# Patient Record
Sex: FEMALE | Race: WHITE | URBAN - METROPOLITAN AREA
[De-identification: names, ages, dates, MRNs, and addresses within clinical notes are randomized per-mention and may not be internally consistent; named-entity substitution may affect disease eponyms.]

---

## 2017-03-24 ENCOUNTER — HISTORICAL (OUTPATIENT)
Dept: ADMINISTRATIVE | Facility: HOSPITAL | Age: 82
End: 2017-03-24

## 2020-03-15 ENCOUNTER — HOSPITAL ENCOUNTER (OUTPATIENT)
Dept: MEDSURG UNIT | Facility: HOSPITAL | Age: 85
End: 2020-03-17
Attending: INTERNAL MEDICINE | Admitting: INTERNAL MEDICINE

## 2020-03-15 LAB
ABS NEUT (OLG): 2.01 X10(3)/MCL (ref 2.1–9.2)
ALBUMIN SERPL-MCNC: 3.2 GM/DL (ref 3.4–5)
ALBUMIN/GLOB SERPL: 1.2 RATIO (ref 1.1–2)
ALP SERPL-CCNC: 141 UNIT/L (ref 38–126)
ALT SERPL-CCNC: 15 UNIT/L (ref 12–78)
APPEARANCE, UA: CLEAR
AST SERPL-CCNC: 18 UNIT/L (ref 15–37)
BACTERIA SPEC CULT: NORMAL /HPF
BASOPHILS # BLD AUTO: 0 X10(3)/MCL (ref 0–0.2)
BASOPHILS NFR BLD AUTO: 1 %
BILIRUB SERPL-MCNC: 0.8 MG/DL (ref 0.2–1)
BILIRUB UR QL STRIP: NEGATIVE
BILIRUBIN DIRECT+TOT PNL SERPL-MCNC: 0.2 MG/DL (ref 0–0.5)
BILIRUBIN DIRECT+TOT PNL SERPL-MCNC: 0.6 MG/DL (ref 0–0.8)
BNP BLD-MCNC: 176 PG/ML (ref 0–125)
BNP BLD-MCNC: 295 PG/ML (ref 0–100)
BUN SERPL-MCNC: 19 MG/DL (ref 7–18)
CALCIUM SERPL-MCNC: 8.5 MG/DL (ref 8.5–10.1)
CHLORIDE SERPL-SCNC: 109 MMOL/L (ref 98–107)
CO2 SERPL-SCNC: 29 MMOL/L (ref 21–32)
COLOR UR: YELLOW
CREAT SERPL-MCNC: 1.04 MG/DL (ref 0.55–1.02)
EOSINOPHIL # BLD AUTO: 0.1 X10(3)/MCL (ref 0–0.9)
EOSINOPHIL NFR BLD AUTO: 3 %
ERYTHROCYTE [DISTWIDTH] IN BLOOD BY AUTOMATED COUNT: 14 % (ref 11.5–17)
GLOBULIN SER-MCNC: 2.7 GM/DL (ref 2.4–3.5)
GLUCOSE (UA): NEGATIVE
GLUCOSE SERPL-MCNC: 121 MG/DL (ref 74–106)
HCT VFR BLD AUTO: 44.9 % (ref 37–47)
HGB BLD-MCNC: 14.4 GM/DL (ref 12–16)
HGB UR QL STRIP: NEGATIVE
KETONES UR QL STRIP: NEGATIVE
LEUKOCYTE ESTERASE UR QL STRIP: NEGATIVE
LYMPHOCYTES # BLD AUTO: 1.6 X10(3)/MCL (ref 0.6–4.6)
LYMPHOCYTES NFR BLD AUTO: 38 %
MAGNESIUM SERPL-MCNC: 2.1 MG/DL (ref 1.8–2.4)
MCH RBC QN AUTO: 31.1 PG (ref 27–31)
MCHC RBC AUTO-ENTMCNC: 32.1 GM/DL (ref 33–36)
MCV RBC AUTO: 97 FL (ref 80–94)
MONOCYTES # BLD AUTO: 0.4 X10(3)/MCL (ref 0.1–1.3)
MONOCYTES NFR BLD AUTO: 10 %
NEUTROPHILS # BLD AUTO: 2.01 X10(3)/MCL (ref 2.1–9.2)
NEUTROPHILS NFR BLD AUTO: 48 %
NITRITE UR QL STRIP: NEGATIVE
PH UR STRIP: 6.5 [PH] (ref 5–9)
PHOSPHATE SERPL-MCNC: 2.9 MG/DL (ref 2.5–4.9)
PLATELET # BLD AUTO: 193 X10(3)/MCL (ref 130–400)
PMV BLD AUTO: 10 FL (ref 9.4–12.4)
POC TROPONIN: 0.01 NG/ML (ref 0–0.08)
POTASSIUM SERPL-SCNC: 3.7 MMOL/L (ref 3.5–5.1)
PROT SERPL-MCNC: 5.9 GM/DL (ref 6.4–8.2)
PROT UR QL STRIP: NEGATIVE
RBC # BLD AUTO: 4.63 X10(6)/MCL (ref 4.2–5.4)
RBC #/AREA URNS HPF: NORMAL /[HPF]
SODIUM SERPL-SCNC: 144 MMOL/L (ref 136–145)
SP GR UR STRIP: 1.02 (ref 1–1.03)
SQUAMOUS EPITHELIAL, UA: NORMAL
UROBILINOGEN UR STRIP-ACNC: 1
WBC # SPEC AUTO: 4.2 X10(3)/MCL (ref 4.5–11.5)
WBC #/AREA URNS HPF: NORMAL /[HPF]

## 2022-04-10 ENCOUNTER — HISTORICAL (OUTPATIENT)
Dept: ADMINISTRATIVE | Facility: HOSPITAL | Age: 87
End: 2022-04-10

## 2022-04-29 VITALS
OXYGEN SATURATION: 94 % | DIASTOLIC BLOOD PRESSURE: 58 MMHG | HEIGHT: 62 IN | WEIGHT: 141.13 LBS | BODY MASS INDEX: 25.97 KG/M2 | SYSTOLIC BLOOD PRESSURE: 118 MMHG

## 2022-04-30 NOTE — H&P
Patient:   Sarai Zapata             MRN: [id number]            FIN: [id number]               Age:   89 years     Sex:  Female     :  1918   Associated Diagnoses:   None   Author:   Pierre Lou II, MD      Chief Complaint   3/15/2020 6:01 CDT       reports legs progressively getting weaker. unable to ambulate with walker this am        History of Present Illness   Ms. Moon is 89-year-old female patient of Dr. Jones is presented to the emergency room today with generalized decline deconditioning.  She does live with her 80-year-old daughter who can no longer take care of her.  Labs are essentially normal chest x-ray is clear urine is clear main complaint is that her legs are progressively getting weaker unable to ambulate with a walker this morning.  Normally is able to ambulate.  Rest of the history was difficult to obtain because the daughter was not in the room on exam.  The patient is awake alert and oriented appears okay vital signs are stable.  She is being admitted because of generalized decline degenerative generalized weakening.  May benefit from placement at some point.  She was recently seen by Dr. Jones in the office had a work-up for her leg edema knee but was negative by way of xray      Review of Systems   Constitutional:  Weakness, Fatigue, Decreased activity, No fever, No chills.    Eye:  Negative.    Ear/Nose/Mouth/Throat:  Negative.    Respiratory:  No shortness of breath, No cough, No hemoptysis, No wheezing.    Cardiovascular:  No chest pain, No palpitations, No peripheral edema.    Gastrointestinal:  No nausea, No vomiting, No diarrhea, No constipation, No heartburn, No abdominal pain, No hematemesis.    Genitourinary:  No dysuria, No hematuria, No change in urine stream, No urethral discharge.    Hematology/Lymphatics:  Negative.    Endocrine:  No excessive thirst, No polyuria, No cold intolerance, No heat intolerance, No excessive hunger.    Immunologic:  Negative.   "  Musculoskeletal:  Joint pain, No neck pain, No muscle pain, No claudication.    Integumentary:  No rash.    Neurologic:  Alert and oriented X4, No confusion.    Psychiatric:  No anxiety, No depression.    All other systems are negative      Health Status   Allergies:    Allergic Reactions (Selected)  No Known Medication Allergies,    Allergies (1) Active Reaction  No Known Medication Allergies None Documented     Current medications:  (Selected)   Prescriptions  Prescribed  18" Standard Wheelchair with elevating leg rests: 18" Standard Wheelchair with elevating leg rests, See Instructions, Dx: decreased mobility, frequent falls, advanced age    RADHA: 99 months, # 1 EA, 0 Refill(s)  hydrochlorothiazide 25 mg oral tablet: See Instructions, TAKE ONE TABLET BY MOUTH ONCE DAILY, # 90 tab(s), 5 Refill(s), eRx: [facility]  meclizine 12.5 mg oral tablet: See Instructions, TAKE ONE TABLET BY MOUTH THREE TIMES DAILY AS NEEDED FOR DIZZINESS, # 90 tab(s), 1 Refill(s), eRx: [id number],    Home Medications (3) Active  18" Standard Wheelchair with elevating leg rests See Instructions  hydrochlorothiazide 25 mg oral tablet See Instructions  meclizine 12.5 mg oral tablet See Instructions     Problem list:    All Problems  Benign Essential Hypertension(  Confirmed  ) / SNOMED CT 5651727 / Confirmed      Histories   Past Medical History:    Resolved  Cataract of right eye(  Confirmed  ) (245129500):  Resolved.  Diarrhea NOS(  Confirmed  ) (104035298):  Resolved.  Pre-operative clearance(  Confirmed  ) (228544749):  Resolved.  Hypertension (18645710):  Resolved.   Family History:    Unable to obtain.   Procedure history:    appendectomy.   Social History        Social & Psychosocial Habits    Alcohol  03/20/2017  Use: Current    Type: Wine    Frequency: 1-2 times per week    Has alcohol use interfered with work or home life? No    Do you ever drink more than intended? No    Has anyone been hurt " or at risk by your drinking? No    Ready to change: No    Concerns about alcohol use in household: No    Employment/School  03/24/2015  Status: Retired    Exercise  03/24/2015 Risk Assessment: Does not exercise      Comment: never - 03/24/2015 09:05 Adelaide Finch    Home/Environment  03/24/2015  Lives with: Children    Nutrition/Health  03/24/2015  Type of diet: Regular    Sexual  03/24/2015  Sexually active: No    Substance Use    Comment: never - 03/24/2015 09:05 Adelaide Finch    Tobacco  03/05/2020  Use: Never (less than 100 in l    Patient Wants Consult For Cessation Counseling N/A    03/15/2020  Use: Never (less than 100 in l    Patient Wants Consult For Cessation Counseling N/A    Abuse/Neglect  03/05/2020  SHX Any signs of abuse or neglect No    Feels unsafe at home: No    Safe place to go: Yes    03/15/2020  [facility]X Any signs of abuse or neglect No  .        Physical Examination   Vital Signs   3/15/2020 11:46 CDT      Temperature Oral          36.3 DegC                             Temperature Oral (calculated)             97.34 DegF                             Peripheral Pulse Rate     91 bpm                             SpO2                      93 %  LOW                             Systolic Blood Pressure   196 mmHg  HI                             Diastolic Blood Pressure  114 mmHg  HI                             Mean Arterial Pressure, Cuff              142 mmHg    3/15/2020 11:00 CDT      Peripheral Pulse Rate     81 bpm                             Heart Rate Monitored      90 bpm                             Respiratory Rate          17 br/min                             SpO2                      98 %                             Oxygen Therapy            Nasal cannula                             Oxygen Flow Rate          2 L/min                             Systolic Blood Pressure   191 mmHg  HI                             Diastolic Blood Pressure  107 mmHg  HI                             Mean  Arterial Pressure, Cuff              135 mmHg    3/15/2020 10:00 CDT      Peripheral Pulse Rate     78 bpm                             Heart Rate Monitored      89 bpm                             Respiratory Rate          17 br/min                             SpO2                      97 %                             Oxygen Therapy            Nasal cannula                             Oxygen Flow Rate          2 L/min                             Systolic Blood Pressure   187 mmHg  HI                             Diastolic Blood Pressure  94 mmHg  HI                             Mean Arterial Pressure, Cuff              125 mmHg    3/15/2020 9:00 CDT       Peripheral Pulse Rate     91 bpm                             Heart Rate Monitored      91 bpm                             Respiratory Rate          15 br/min                             SpO2                      97 %                             Oxygen Therapy            Nasal cannula                             Oxygen Flow Rate          2 L/min                             Systolic Blood Pressure   199 mmHg  HI                             Diastolic Blood Pressure  104 mmHg  HI                             Mean Arterial Pressure, Cuff              136 mmHg    3/15/2020 8:31 CDT       Peripheral Pulse Rate     85 bpm                             Heart Rate Monitored      89 bpm                             Respiratory Rate          21 br/min                             SpO2                      97 %                             Oxygen Therapy            Nasal cannula                             Oxygen Flow Rate          2 L/min                             Systolic Blood Pressure   184 mmHg  HI                             Diastolic Blood Pressure  92 mmHg  HI                             Mean Arterial Pressure, Cuff              123 mmHg    3/15/2020 8:00 CDT       Peripheral Pulse Rate     77 bpm                             Heart Rate Monitored      91 bpm                              Respiratory Rate          20 br/min                             SpO2                      98 %                             Oxygen Therapy            Nasal cannula                             Oxygen Flow Rate          2 L/min                             Systolic Blood Pressure   193 mmHg  HI                             Diastolic Blood Pressure  98 mmHg  HI                             Mean Arterial Pressure, Cuff              130 mmHg    3/15/2020 7:00 CDT       Peripheral Pulse Rate     85 bpm                             Heart Rate Monitored      90 bpm                             Respiratory Rate          17 br/min                             SpO2                      95 %                             Oxygen Therapy            Nasal cannula                             Oxygen Flow Rate          2 L/min                             Systolic Blood Pressure   179 mmHg  HI                             Diastolic Blood Pressure  90 mmHg                             Mean Arterial Pressure, Cuff              120 mmHg    3/15/2020 6:37 CDT       Peripheral Pulse Rate     84 bpm                             Heart Rate Monitored      90 bpm                             Respiratory Rate          21 br/min                             SpO2                      96 %                             Oxygen Therapy            Nasal cannula                             Oxygen Flow Rate          2 L/min                             Systolic Blood Pressure   174 mmHg  HI                             Diastolic Blood Pressure  84 mmHg                             Mean Arterial Pressure, Cuff              114 mmHg    3/15/2020 6:01 CDT       Temperature Oral          36.6 DegC                             Temperature Oral (calculated)             97.88 DegF                             Peripheral Pulse Rate     96 bpm                             Respiratory Rate          18 br/min                             SpO2                      94 %                              Oxygen Therapy            Nasal cannula                             Oxygen Flow Rate          2 L/min                             Systolic Blood Pressure   158 mmHg  HI                             Diastolic Blood Pressure  63 mmHg     Measurements from flowsheet : Measurements   3/15/2020 11:52 CDT      Weight Dosing             68 kg                             Weight Measured           68 kg                             Weight Measured and Calculated in Lbs     149.91 lb                             Height/Length Dosing      160 cm                             Height/Length Measured    160 cm                             Body Mass Index Measured  26.56 kg/m2    3/15/2020 6:01 CDT       Weight Dosing             73 kg                             Weight Measured and Calculated in Lbs     160.94 lb                             Weight Estimated          73 kg                             Height/Length Estimated   165 cm                             Body Mass Index Estimated 26.81 kg/m2     General:  Alert and oriented, No acute distress.    Eye:  Pupils are equal, round and reactive to light, Extraocular movements are intact.    HENT:  Normocephalic.    Neck:  Supple, Non-tender, No carotid bruit, No jugular venous distention, No lymphadenopathy, No thyromegaly.    Respiratory:  Lungs are clear to auscultation, Respirations are non-labored, Breath sounds are equal, Symmetrical chest wall expansion.    Cardiovascular:  Normal rate, Regular rhythm, No gallop, Normal peripheral perfusion, 2/6 PAUL, 1+ edema.    Gastrointestinal:  Non-tender, Non-distended, Normal bowel sounds, No organomegaly.    Genitourinary:  No costovertebral angle tenderness.    Lymphatics:  No lymphadenopathy neck, axilla, groin.    Integumentary:  Warm.    Neurologic:  Alert, Oriented, Normal sensory, Normal motor function, No focal deficits, Cranial Nerves II-XII are grossly intact, Normal deep tendon reflexes.    Psychiatric:   Cooperative, Appropriate mood & affect, Normal judgment.       Health Maintenance      Health Maintenance     Pending (in the next year)        OverDue           Tetanus Vaccine due  03/20/18  and every 10  year(s)           Zoster Vaccine due  03/20/18  and every 100  year(s)           Cognitive Screening due  01/01/20  and every 1  year(s)           Fall Risk Assessment due  01/01/20  and every 1  year(s)        Due            Pneumococcal Vaccine due  03/15/20  and every         Refused            Bone Density Screening due  03/15/20  Variable frequency           Pneumococcal Vaccine due  03/15/20  Variable frequency        Due In Future            Hypertension Management-Education not due until  08/21/20  and every 1  year(s)           Medicare Annual Wellness Exam not due until  08/21/20  and every 1  year(s)           Advance Directive not due until  01/01/21  and every 1  year(s)           Functional Assessment not due until  01/01/21  and every 1  year(s)           Geriatric Depression Screening not due until  01/01/21  and every 1  year(s)           Obesity Screening not due until  01/01/21  and every 1  year(s)           ADL Screening not due until  03/05/21  and every 1  year(s)     Satisfied (in the past 1 year)        Satisfied            ADL Screening on  03/05/20.  Satisfied by Hannah Sigala.           Advance Directive on  03/05/20.  Satisfied by Hannah Sigala.           Blood Pressure Screening on  03/15/20.  Satisfied by Tiffanie Lagunas CNA           Body Mass Index Check on  03/15/20.  Satisfied by Hortencia Paredes RN           Depression Screening on  03/05/20.  Satisfied by Hannah Sigala.           Diabetes Screening on  03/15/20.  Satisfied by Anastasia Porter           Functional Assessment on  03/15/20.  Satisfied by Hortencia Paredes RN           Geriatric Depression Screening on  03/05/20.  Satisfied by Hannah Sigala.           Hypertension Management-Blood  Pressure on  03/15/20.  Satisfied by Tiffanie Lagunas CNA           Hypertension Management-BMP on  03/15/20.  Satisfied by Anastasia Porter           Hypertension Management-Education on  08/21/19.  Satisfied by Gisela Lopez LPN.           Influenza Vaccine on  03/15/20.  Satisfied by Hortencia Paredes RN           Medicare Annual Wellness Exam on  08/21/19.  Satisfied by Jan Jones MD           Obesity Screening on  03/15/20.  Satisfied by Hortencia Paredes RN        Refused            Pneumococcal Vaccine on  08/21/19.  Recorded by Gisela Lopez LPN.  Reason: Patient Refuses          Review / Management   Results review:  Lab results   3/15/2020 9:58 CDT       POC Troponin              0.01 ng/mL    3/15/2020 7:35 CDT       UA Appear                 Clear                             UA Color                  Yellow                             UA Spec Grav              1.020                             UA Bili                   Negative                             UA pH                     6.5                             UA Urobilinogen           1.0                             UA Blood                  Negative                             UA Glucose                Negative                             UA Ketones                Negative                             UA Protein                Negative                             UA Nitrite                Negative                             UA Leuk Est               Negative                             UA WBC                    None Seen                             UA RBC                    None Seen                             UA Bacteria               None Seen /HPF                             UA Squam Epithelial       None Seen    3/15/2020 6:37 CDT       POC BNP iSTAT             295 pg/mL  HI    3/15/2020 6:33 CDT       Sodium Lvl                144 mmol/L                             Potassium Lvl             3.7 mmol/L                              Chloride                  109 mmol/L  HI                             CO2                       29.0 mmol/L                             Calcium Lvl               8.5 mg/dL                             Magnesium Lvl             2.1 mg/dL                             Glucose Lvl               121 mg/dL  HI                             BUN                       19.0 mg/dL  HI                             Creatinine                1.04 mg/dL  HI                             eGFR-AA                   >60 mL/min/1.73 m2  NA                             eGFR-MARTHA                  52 mL/min/1.73 m2  NA                             Bili Total                0.8 mg/dL                             Bili Direct               0.20 mg/dL                             Bili Indirect             0.60 mg/dL                             AST                       18 unit/L                             ALT                       15 unit/L                             Alk Phos                  141 unit/L  HI                             Total Protein             5.9 gm/dL  LOW                             Albumin Lvl               3.20 gm/dL  LOW                             Globulin                  2.70 gm/dL                             A/G Ratio                 1.2 ratio                             Phosphorus                2.9 mg/dL                             BNP                       176 pg/mL  HI                             WBC                       4.2 x10(3)/mcL  LOW                             RBC                       4.63 x10(6)/mcL                             Hgb                       14.4 gm/dL                             Hct                       44.9 %                             Platelet                  193 x10(3)/mcL                             MCV                       97.0 fL  HI                             MCH                       31.1 pg  HI                             MCHC                      32.1 gm/dL  LOW                              RDW                       14.0 %                             MPV                       10.0 fL                             Abs Neut                  2.01 x10(3)/mcL  LOW                             Neutro Auto               48 %  NA                             Lymph Auto                38 %  NA                             Mono Auto                 10 %  NA                             Eos Auto                  3 %  NA                             Abs Eos                   0.1 x10(3)/mcL                             Basophil Auto             1 %  NA                             Abs Neutro                2.01 x10(3)/mcL  LOW                             Abs Lymph                 1.6 x10(3)/mcL                             Abs Mono                  0.4 x10(3)/mcL                             Abs Baso                  0.0 x10(3)/mcL  .       Impression and Plan   1.  Advanced age with significant progressive deconditioning.   2.  Hypertension  3.  Work-up was negative about a week ago  4.  Malnutrition however albumin is 3.2    Plan:  1.  Being admitted for observation.  2.  Given her advanced deconditioning and also the 80-year-old daughter now can no longer care for appropriately may need case management involvement for placement purposes.  3.  PRN antihypertensives  4.  The daughter was not in the room when it was not able to obtain resuscitative status  5.  Dr. Jones will be back tomorrow

## 2022-04-30 NOTE — DISCHARGE SUMMARY
DISCHARGE DATE:      HOSPITAL COURSE:  A 89-year-old female patient with history of hypertension, otherwise, very healthy for her age, who has been falling at home.  The daughter, who has not been able to take care of her, decided to bring her to the emergency room.  Since the patient's insurance will not cover a nursing home placement, therefore, she will go home with home health.  Other than that, patient has been participating in physical therapy.  Excellent appetite.  Bowels are moving.  No fever, no chills.  At this moment, due to the coronavirus epidemic, it is best for her to be at home.  Case was discuss with the patient and her family.  Arrangement is made for home health.  Patient will be picked up today by her daughter to go home.  Fall did not cause any trauma or fracture.  Patient is totally asymptomatic, good sleeping habits and good spirits.    PHYSICAL EXAMINATION:  VITAL SIGNS:  Blood pressure 154/70, respiratory rate 16, pulse is 90, temperature 36.6.   HEENT:  Within normal limits.   HEART:  Auscultation, systolic murmur 3-4/6.   LUNGS:  Clear bilaterally.   ABDOMEN:  Benign.   EXTREMITIES:  No clubbing, cyanosis or edema.    FINAL DIAGNOSES:  Weakness, multifactorial, in a very elderly patient with history of hypertension controlled, osteoarthritis, frequent falls, again, multifactorial.     Due to fear of contamination, we will not schedule a followup appointment with the lady.  We will call her with telemedicine or from the office.    CONDITION:  Stable.        ______________________________  Jan Jones MD    JJP/KILEY  DD:  03/17/2020  Time:  11:31AM  DT:  03/17/2020  Time:  11:44AM  Job #:  [id number]

## 2022-04-30 NOTE — ED PROVIDER NOTES
Patient:   Sarai Zapata             MRN: [id number]            FIN: [id number]               Age:   89 years     Sex:  Female     :  1918   Associated Diagnoses:   Weakness   Author:   Lawrence Sharma III, MD      Basic Information   Time seen: Date & time 3/15/2020 06:11:00.   History source: Nurse.   Arrival mode: Ambulance.   History limitation: Cognitive impairment.      History of Present Illness   The patient presents with     89 y/o CF with a history of HTN presents to the ED via EMS for complaints of worsening weakness in her legs bilaterally. States she was unable to ambulate or sit down this morning as she was walking to use the bathroom. Reports she began to hang on her walker before she slumped over. Denies SOB, abdominal pain, chest pain, or LE edema..  The onset was unknown.  The course/duration of symptoms is constant.  The character of symptoms is unable to walk.  The degree at present is moderate.  Risk factors consist of hypertension.  Prior episodes: none.  Therapy today: emergency medical services.  Associated symptoms: denies chest pain, denies abdominal pain and denies shortness of breath.        Review of Systems             Additional review of systems information: Unable to obtain due to: Clinical condition.      Health Status   Allergies: No known allergies.   Medications: Per nurse's notes.      Past Medical/ Family/ Social History   Medical history:    Resolved  Cataract of right eye(  Confirmed  ) ([id number]):  Resolved.  Diarrhea NOS(  Confirmed  ) ([id number]):  Resolved.  Pre-operative clearance(  Confirmed  ) ([id number]):  Resolved.  Hypertension ([id number]):  Resolved..   Surgical history:    appendectomy..   Family history: Not significant.   Social history: Alcohol use: Occasionally, Tobacco use: Denies, Drug use: Denies.      Physical Examination               Vital Signs   Vital Signs   3/15/2020 6:01 CDT       Temperature Oral          36.6 DegC                              Temperature Oral (calculated)             97.88 DegF                             Peripheral Pulse Rate     96 bpm                             Respiratory Rate          18 br/min                             SpO2                      94 %                             Oxygen Therapy            Nasal cannula                             Oxygen Flow Rate          2 L/min                             Systolic Blood Pressure   158 mmHg  HI                             Diastolic Blood Pressure  63 mmHg  .   Measurements   3/15/2020 6:01 CDT       Weight Dosing             73 kg                             Weight Measured and Calculated in Lbs     160.94 lb                             Weight Estimated          73 kg                             Height/Length Estimated   165 cm                             Body Mass Index Estimated 26.81 kg/m2  .   Basic Oxygen Information   3/15/2020 6:01 CDT       SpO2                      94 %                             Oxygen Flow Rate          2 L/min                             Oxygen Therapy            Nasal cannula  .   General:  Alert, no acute distress, elderly female   Skin:  Warm, pink, intact, moist, no rash   Head:  Normocephalic, atraumatic.    Cardiovascular:  Regular rate and rhythm, No murmur, Normal peripheral perfusion, Edema: Bilateral, lower extremity.    Respiratory:  Lungs are clear to auscultation, respirations are non-labored, breath sounds are equal, Symmetrical chest wall expansion.    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    Neurological:  Normal sensory observed, normal motor observed, normal speech observed, Cognitive function: Oriented x 2.    Lymphatics:  No lymphadenopathy.   Psychiatric:  Cooperative, appropriate mood & affect.       Medical Decision Making   Documents reviewed:  Emergency department nurses' notes, emergency department records,  Has been evaluated for lower extremity edema.    Orders  Launch Order Profile (Selected)   Inpatient Orders  Ordered  Capacity Management Bed Request: 03/15/20 10:07:15 CDT, Medical Unit  EKG if >30 years of age and pain at or above umbilicus or history of ischemic heart disease.: 03/15/20 6:19:00 CDT, EKG if >30 years of age and pain at or above umbilicus or history of ischemic heart disease.  If hemodynamically unstable (BP < 100 or Pulse > 110-Notify MD Stat). IV bolus with 500 ml NS.: 03/15/20 6:19:00 CDT, If hemodynamically unstable (BP < 100 or Pulse > 110-Notify MD Stat). IV bolus with 500 ml NS.  Intermittent Catheterization: 03/15/20 7:30:00 CDT, Stop date 03/15/20 7:30:00 CDT, 03/15/20 7:30:00 CDT  NPO: 03/15/20 6:19:00 CDT, CM NPO  O2 Therapy: 03/15/20 6:05:50 CDT  Place in Outpatient Observation: 03/15/20 10:07:00 CDT, Medical Unit Robert LIN, Jan SPEARS, No  Saline Lock Insert: 03/15/20 6:19:00 CDT, Stop date 03/15/20 6:19:00 CDT  Vital Signs Notify Provider: 03/15/20 6:19:00 CDT, if hemodynamically unstable (BP < 100 or Pulse > 110-Notify MD Stat)., HR > 110, SBP < 100  Ordered (Collected)  POC iSTAT ER Troponin request: BLOOD, STAT collect, Collected, 03/15/20 9:49:49 CDT, Stop date 03/15/20 9:49:00 CDT, Lab Collect, Print Label By Order Location  Completed  Automated Diff: Stat collect, 03/15/20 6:33:00 CDT, Blood, Collected, Stop date 03/15/20 6:33:00 CDT, Lab Collect, Print Label By Order Location, 03/15/20 6:19:00 CDT  BNP: Stat collect, 03/15/20 6:23:00 CDT, Blood, Stop date 03/15/20 6:23:00 CDT, Lab Collect, Print Label By Order Location, 03/15/20 6:23:00 CDT  CBC w/ Auto Diff: Stat collect, 03/15/20 6:19:00 CDT, Blood, Stop date 03/15/20 6:19:00 CDT, Lab Collect, Print Label By Order Location, 03/15/20 6:19:00 CDT  CMP: Stat collect, 03/15/20 6:19:00 CDT, Blood, Stop date 03/15/20 6:19:00 CDT, Lab Collect, Print Label By Order Location, 03/15/20 6:19:00 CDT  EKG Adult 12 Lead: 03/15/20 6:19:00 CDT,  Stat, 03/15/20 6:19:00 CDT, EKG if >30 years of age and pain at or above umbilicus or history of ischemic heart disease., -1, -1, 03/15/20 6:19:00 CDT  Estimated Glomerular Filtration Rate: Stat collect, 03/15/20 6:33:00 CDT, Blood, Collected, Stop date 03/15/20 6:33:00 CDT, Lab Collect, Print Label By Order Location, 03/15/20 6:19:00 CDT  IVF Normal Saline NS Bolus 500ml 500 mL: 500 mL, 500 mL, IV, 500 mL/hr, order duration: 1 dose(s), start date 03/15/20 6:19:00 CDT, stop date 03/15/20 7:18:00 CDT, bolus dose: 500 mL, Then 125 ml/hr if no history of CHF., 1.74, m2  Mg Level: Stat collect, 03/15/20 6:19:00 CDT, Blood, Stop date 03/15/20 6:19:00 CDT, Lab Collect, Print Label By Order Location, 03/15/20 6:19:00 CDT  POC BNP iSTAT: Blood, Stat collect, Collected, 03/15/20 6:37:08 CDT  Phosphorus Level: Stat collect, 03/15/20 6:19:00 CDT, Blood, Stop date 03/15/20 6:19:00 CDT, Lab Collect, Print Label By Order Location, 03/15/20 6:19:00 CDT  Urinalysis with Reflex: Stat collect, Urine, 03/15/20 6:19:00 CDT, Stop date 03/15/20 6:19:00 CDT, Nurse collect  XR Ankle Right Minimum 3 Views: Stat, 03/15/20 7:49:00 CDT, Pain, None, Stretcher, Rad Type, Not Scheduled, 03/15/20 7:49:00 CDT  XR Chest 1 View: Stat, 03/15/20 6:19:00 CDT, Congestion, None, Stretcher, Rad Type, Not Scheduled, 03/15/20 6:19:00 CDT  Discontinued  XR Ankle Right 2 Views: Stat, 03/15/20 7:41:00 CDT, Pain, None, Stretcher, Rad Type, Not Scheduled, 03/15/20 7:41:00 CDT.   Electrocardiogram:  Time 3/15/2020 06:26:00, rate 95, normal sinus rhythm, No ST-T changes, EP Interp, Ectopy Premature atrial contractions, QRS interval Left ventricular hypertrophy.    Results review:  Lab results : Lab View   3/15/2020 7:35 CDT       UA Appear                 Clear                             UA Color                  Yellow                             UA Spec Grav              1.020                             UA Bili                   Negative                              UA pH                     6.5                             UA Urobilinogen           1.0                             UA Blood                  Negative                             UA Glucose                Negative                             UA Ketones                Negative                             UA Protein                Negative                             UA Nitrite                Negative                             UA Leuk Est               Negative                             UA WBC                    None Seen                             UA RBC                    None Seen                             UA Bacteria               None Seen /HPF                             UA Squam Epithelial       None Seen    3/15/2020 6:37 CDT       POC BNP iSTAT             295 pg/mL  HI    3/15/2020 6:33 CDT       Sodium Lvl                144 mmol/L                             Potassium Lvl             3.7 mmol/L                             Chloride                  109 mmol/L  HI                             CO2                       29.0 mmol/L                             Calcium Lvl               8.5 mg/dL                             Magnesium Lvl             2.1 mg/dL                             Glucose Lvl               121 mg/dL  HI                             BUN                       19.0 mg/dL  HI                             Creatinine                1.04 mg/dL  HI                             eGFR-AA                   >60 mL/min/1.73 m2  NA                             eGFR-MARTHA                  52 mL/min/1.73 m2  NA                             Bili Total                0.8 mg/dL                             Bili Direct               0.20 mg/dL                             Bili Indirect             0.60 mg/dL                             AST                       18 unit/L                             ALT                       15 unit/L                             Alk Phos                  141 unit/L  HI                              Total Protein             5.9 gm/dL  LOW                             Albumin Lvl               3.20 gm/dL  LOW                             Globulin                  2.70 gm/dL                             A/G Ratio                 1.2 ratio                             Phosphorus                2.9 mg/dL                             BNP                       176 pg/mL  HI                             WBC                       4.2 x10(3)/mcL  LOW                             RBC                       4.63 x10(6)/mcL                             Hgb                       14.4 gm/dL                             Hct                       44.9 %                             Platelet                  193 x10(3)/mcL                             MCV                       97.0 fL  HI                             MCH                       31.1 pg  HI                             MCHC                      32.1 gm/dL  LOW                             RDW                       14.0 %                             MPV                       10.0 fL                             Abs Neut                  2.01 x10(3)/mcL  LOW                             Neutro Auto               48 %  NA                             Lymph Auto                38 %  NA                             Mono Auto                 10 %  NA                             Eos Auto                  3 %  NA                             Abs Eos                   0.1 x10(3)/mcL                             Basophil Auto             1 %  NA                             Abs Neutro                2.01 x10(3)/mcL  LOW                             Abs Lymph                 1.6 x10(3)/mcL                             Abs Mono                  0.4 x10(3)/mcL                             Abs Baso                  0.0 x10(3)/mcL  .   Radiology results:  Reviewed radiologist's report, Rad Results (ST)  < 12 hrs   Accession: [id number]  Order: XR Ankle Right Minimum 3  Views  Report Dt/Tm: 03/15/2020 09:15  Report:   EXAMINATION: 3 views of the right ankle     EXAMINATION DATE: 3/15/2020     COMPARISON: None     TECHNIQUE: As above     CLINICAL HISTORY: Pain     FINDINGS:     No fracture. The alignment and joint spaces are normal. Elevation the  ankle mortise. Diffuse osteopenia. Diffuse soft tissue swelling is  identified greater on the lateral ankle.     IMPRESSION:     No fracture or dislocation of the right ankle with diffuse soft tissue  swelling.    Accession: [id number]  Order: XR Chest 1 View  Report Dt/Tm: 03/15/2020 09:12  Report:      CLINICAL:  Congestion.     COMPARISON: March 25, 2016.     FINDINGS:  Cardiopericardial silhouette is within normal limits.   Lungs are without dense focal or segmental consolidation, congestion,  pleural effusion or pneumothorax. There is a hiatal hernia.     IMPRESSION:     No acute cardiopulmonary process identified.         .       Reexamination/ Reevaluation   Time: 3/15/2020 10:17:00 .   Interventions: Per daughter at baseline neurologically with daughter can no longer care for patient will admit as observation for social service consult.      Impression and Plan   Diagnosis   Weakness ([id number] R53.1)      Calls-Consults   -  chastant for santos-admit obs ivf .   Plan   Condition: Improved, Stable.    Disposition: Medically cleared, Admit time  3/15/2020 10:18:00, Admit to Inpatient Unit.    Follow up with: Primary Care Physician.    Counseled: Patient, Regarding diagnosis, Regarding diagnostic results, Regarding treatment plan, Regarding prescription, Patient indicated understanding of instructions, pt limited due to cognitive impairment.    Notes: I, Princess Block, acted solely as a scribe for and in the presence of Dr. Sharma who performed the service. ,       This scribes note accurately reflects the work done by me I have reviewed the note and personally performed a history and physical and agree with all the documentation  and findings.